# Patient Record
Sex: MALE | Race: WHITE
[De-identification: names, ages, dates, MRNs, and addresses within clinical notes are randomized per-mention and may not be internally consistent; named-entity substitution may affect disease eponyms.]

---

## 2019-04-20 ENCOUNTER — HOSPITAL ENCOUNTER (EMERGENCY)
Dept: HOSPITAL 95 - ER | Age: 34
Discharge: HOME | End: 2019-04-20
Payer: MEDICARE

## 2019-04-20 VITALS — WEIGHT: 295.99 LBS | HEIGHT: 64 IN | BODY MASS INDEX: 50.53 KG/M2

## 2019-04-20 DIAGNOSIS — J45.901: Primary | ICD-10-CM

## 2019-04-20 DIAGNOSIS — Z79.899: ICD-10-CM

## 2019-04-20 DIAGNOSIS — F32.9: ICD-10-CM

## 2019-04-20 LAB
ALBUMIN SERPL BCP-MCNC: 3.1 G/DL (ref 3.4–5)
ALBUMIN/GLOB SERPL: 0.6 {RATIO} (ref 0.8–1.8)
ALT SERPL W P-5'-P-CCNC: 15 U/L (ref 12–78)
ANION GAP SERPL CALCULATED.4IONS-SCNC: 8 MMOL/L (ref 6–16)
AST SERPL W P-5'-P-CCNC: 13 U/L (ref 12–37)
BASOPHILS # BLD AUTO: 0.07 K/MM3 (ref 0–0.23)
BASOPHILS NFR BLD AUTO: 1 % (ref 0–2)
BILIRUB SERPL-MCNC: 0.3 MG/DL (ref 0.1–1)
BUN SERPL-MCNC: 19 MG/DL (ref 8–24)
CALCIUM SERPL-MCNC: 9 MG/DL (ref 8.5–10.1)
CHLORIDE SERPL-SCNC: 103 MMOL/L (ref 98–108)
CO2 SERPL-SCNC: 27 MMOL/L (ref 21–32)
CREAT SERPL-MCNC: 1.01 MG/DL (ref 0.6–1.2)
DEPRECATED RDW RBC AUTO: 50.9 FL (ref 35.1–46.3)
EOSINOPHIL # BLD AUTO: 0.01 K/MM3 (ref 0–0.68)
EOSINOPHIL NFR BLD AUTO: 0 % (ref 0–6)
ERYTHROCYTE [DISTWIDTH] IN BLOOD BY AUTOMATED COUNT: 14.3 % (ref 11.7–14.2)
GLOBULIN SER CALC-MCNC: 5.1 G/DL (ref 2.2–4)
GLUCOSE SERPL-MCNC: 98 MG/DL (ref 70–99)
HCT VFR BLD AUTO: 37.6 % (ref 37–53)
HGB BLD-MCNC: 12.2 G/DL (ref 13.5–17.5)
IMM GRANULOCYTES # BLD AUTO: 0.05 K/MM3 (ref 0–0.1)
IMM GRANULOCYTES NFR BLD AUTO: 1 % (ref 0–1)
LYMPHOCYTES # BLD AUTO: 1.11 K/MM3 (ref 0.84–5.2)
LYMPHOCYTES NFR BLD AUTO: 12 % (ref 21–46)
MCHC RBC AUTO-ENTMCNC: 32.4 G/DL (ref 31.5–36.5)
MCV RBC AUTO: 98 FL (ref 80–100)
MONOCYTES # BLD AUTO: 0.65 K/MM3 (ref 0.16–1.47)
MONOCYTES NFR BLD AUTO: 7 % (ref 4–13)
NEUTROPHILS # BLD AUTO: 7.23 K/MM3 (ref 1.96–9.15)
NEUTROPHILS NFR BLD AUTO: 79 % (ref 41–73)
NRBC # BLD AUTO: 0 K/MM3 (ref 0–0.02)
NRBC BLD AUTO-RTO: 0 /100 WBC (ref 0–0.2)
PLATELET # BLD AUTO: 175 K/MM3 (ref 150–400)
POTASSIUM SERPL-SCNC: 3.8 MMOL/L (ref 3.5–5.5)
PROT SERPL-MCNC: 8.2 G/DL (ref 6.4–8.2)
SODIUM SERPL-SCNC: 138 MMOL/L (ref 136–145)

## 2021-09-04 NOTE — NUR
SHIFT SUMMARY/URINARY RETENTION
PT TRANSITIONED TO THE CPAP 70% FIO2. PT CURRENTLY ON HFT MODE AND TOLERATING
WELL AT 92%. PT UNABLE TO VOID THIS SHIFT. BLADDER SCAN VOLUME REVEALED MORE
THAN 1400. DR. CHRISTENSEN NOTIFIED AND ORDER FOR SORENSEN CATH OBTAINED. SORENSEN PLACED.
PT DID NOT TOLERATE WELL. UNCIRCUMSIZED AND URETHRA WAS DIFFICULT TO LOCATE.
DISCHARGE IN THE FORSKIN. SORESNEN NOW DRAINING WELL. CLAMPED AFTER 1300 CC
OUTPUT. MICONAZOLE ORDERED FOR YEAST IN FOLDS.

## 2021-09-04 NOTE — NUR
PTS MOTHER DAVIE UPDATED
 
PTS MOTHER UPDATED. DURING UPDATE SHE STATED THAT SHE DID NOT WANT HER SON
RECIEVING REMDESIVIR. PT EDUCATED THAT WE OBSEVERE KIDNEY FUNCTION PRIOR TO
STARTING THIS MEDICATION. 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
IVERMECTIN INSTEAD. DR. CHAVARRIA RELAYED THIS INFO AND GIVEN PTS MOTHERS NUMBER.

## 2021-09-04 NOTE — NUR
SHIFT SUMMARY - NO ACUTE CHANGES SINCE ADMIT LAST NOC AT 2400.  LR INFUSING
WITHOUT DIFFICULTY TO LEFT HAND IV SITE.  PT'S MOUTH/LIPS VERY DRY - ORAL CARE
PROVIDED, PT TOLERATED PO FLUIDS WITHOUT DIFFICULTY.  SATS WNL 15 L OXYGEN VIA
HF NC.  PT HAS DOWN SYNDROME.  PT SLOW TO RESPOND, BUT HAS BEEN ABLE TO MAKE
HIS NEEDS KNOWN WHILE IN THE ROOM.  CALL LIGHT WITHIN REACH.  BED IN LOW
POSITION.  FLUIDS AT BEDSIDE.  BED ALARM ON FOR PT SAFETY.

## 2021-09-04 NOTE — NUR
UPGRADE TO BIPAP.
PER CONVO WITH DR. CHAVARRIA THIS AM, PT NEEDS TO BE PLACED ON BIPAP TO HELP WITH
CO2 RETENTION. AILIN, RT NOTIFIED.

## 2021-09-04 NOTE — NUR
PT IS CURRENTLY SLEEPING, AWAKENS TO VERBAL COMMUNICATION BUT DRIFTS OFF BACK
TO SLEEP.  PT AWOKE STARTED, REORIENTED TO ROOM, CALL LIGHT.  BED ALARM ON.
URINAL AT BEDSIDE.

## 2021-09-05 NOTE — NUR
PT WAS ALERT AND ORIENTED AT START OF SHIFT. ON AIRVO AND SATS 90 AND ABOVE.
AT 0500 PT STARTED TO DESATURATE ON MONITOR. STAFF ENTERED ROOM WITH PULSE OX
AND MONITORED FROM INSIDE. PT CONTINUED TO DESATURATE
TO MID 60'S AND BECAME UNRESPONSIVE WHILE RT ON
THEIR WAY. ONCE ON BIPAP PT'S O2 STARTED TO INCREASE BACK TO HIS BASELINE. PT
THEN BECAME ALERT AND SCARED. PT STARTED TO SHAKE RAPIDLY WHILE TRYING TO PULL
HIS BIPAP OFF AND CLIMB OUT OF BED. PT STATES
"TAKE IT OFF PLEASE AND i WANT MY MOM." ORDER FOR 1 MG IV ATIVAN WAS ADMIN AND
PT WAS ABLE TO RELAX. CURRENTLY RESTING ON BIPAP WITH SATS 90 AND ABOVE. STAFF
WILL CONT TO MONITOR.

## 2021-09-05 NOTE — NUR
SHIFT SUMMARY
PT ON BIPAP MOST THE DAY. LETHARGIC THIS AM. ABG CHECKED TODAY. PT MOM
VISITED AND UPDATED ON PLAN OF CARE AT THIS TIME. PT TRANSITIONED TO HFT MODE
WITH NASAL CANULA CURRENTLY AND TOLERATING WELL. ONE TIME DOSE OF LASIX GIVEN
TO HELP WITH FLUID RETENTION. SORENSEN PATENT & DRAINING.

## 2021-09-06 NOTE — NUR
----- Message from Pratibha Diallo NP sent at 3/7/2019  3:45 PM CST -----  Nonspecific ST and/or T wave abnormalities. Pt advised to discuss with pcp. I will also will have MA fax EKG to pcp. QTc is 478 (normal is 480) she is already on celexa 20 mg daily and elavil 10 mg daily. I am hesitant to increase elavil to 25 mg given QTc is so close to abnormal and celexa can also prolong QTC (2%). Please advise,  Anu NP   SHIFT SUMMARY
PATIENT HAD NO ACUTE CHANGES OBSERVED. LETHARGIC T/O SHIFT. ON HFT MODE
STATING 86-92%. SORENSEN PATENT AND DRAINING. PIV REMAINS INTACT. IV ABXS
INFUSED. LR INFUSING AT 75mL/HR. DENIES PAIN AND N/V. BEDREST. NO ANXIETY
EVENTS OBSEVED. CBG 99. CALL LIGHT IN REACH. BED IN LOWEST POSITION AND ALARM
ACTIVATED. WCTM UNTIL DAY SHIFT NURSE ARRIVES.

## 2021-09-06 NOTE — NUR
PT AOX3 AND COOPERATIVE OF CARE. PT HAS BEEN DOING WELL TODAY AND WAS PUT ON
HIGH FLOW NASAL CANULA AND HAS BEEN DOING WELL. PT SEEMS TO BE IN GOOD
SPIRITS AND MOTHER IS AT BEDSIDE VISITING. PT IS ABLE TO USE CALL LIGHT AND
BED ALARM IS IN PLACE. PT CONTINUES TO HAVE A COUGH AND WILL DESAT WITH  A LOT
OF MOVEMENT, BUT RECOVERS. WILL CONTINUE TO MONITOR.

## 2021-09-07 NOTE — NUR
ADMIT: 09/04/21       DISCHARGE: TBD          DX: ACUTE RESP. FAILURE DUE TO
COVID-19
CC: NNEKA LUCIANO
LYNDSEY CALL: MOTHER DAVIE
RESIDENCE: LIVES WITH MOTHER - [address] [facility] [address]
CAREGIVER: MOTHER DAVIE [phone]
 
PRIOR TO ADMIT - DME: CPAP   CHRONIC CARE MANAGEMENT: NONE    HHC/HOSPICE:
NONE
 
UPDATE 09/07/21: PT. ADMITTED OVER THE WEEKEND. PER CHART REVIEW THIS AM, PT.
DID NOT HAVE ANY SIGNIFICANT CHANGES OVERNIGHT. NOTED TO CURRENTLY BY ON 15LPM
NC WITH SPO2 OF 91%. INCREASE FROM THE 10 LPM HAS BEEN ON. PATIENT'S MOTHER
HAS VISITED HIM TODAY. SHE HAS BEEN UPDATED REGARDING HIS CONDITION.
ANTICIPATED NEEDS AT TIME OF DISCHARGE TO INCLUDE: HOME O2 EVAL., HOME O2
POTENTIALLY (DEPENDENT UPON IMPROVEMENTS), XARELTO/ELQUIS SAMPLES, AND
HOSPITAL F/U WITH 5-7 DAYS POST DISCHARGE. PLAN TO CONTINUE TO FOLLOW PATIENTS
PROGRESS AND ASSIST WITH CARE COORDINATION AND DISCHARGE PLANNING AS NEEDED.

## 2021-09-07 NOTE — NUR
Pt did well throughout the night, pt on NC, tolerated well. Pt takes pills in
a/s, LR running at 75. Contiue POC

## 2021-09-07 NOTE — NUR
SHIFT SUMMARY
 
PATIENT ALERT AND ORIENTED X4. PATIENT PLEASANT AND COOPERATIVE WITH CARE.
PATIENT HAS BEEN TITRATED DOWN FROM 55L 85% TO 50L 67% ON AIRVO AND
MAINTAINING SATS AT 93-94 PERCENT ON LOWERED TITRATION. PATIENT STATES THAT HE
FEELS BETTER AND DOES NOT COMPLAIN OF PAIN, SHORTNESS OF BREATH OR ANY NEEDS
AT THIS TIME. PATIENTS DAUGHTER WAS UPDATED ON PATIENTS STATUS AND OXYGEN
NEEDS THIS AFTERNOON. PATIENT IS CONINENT AND USES URINAL. TELE SINUS RHYTHM.
WILL CONTINUE TO MONITOR UNTIL END OF SHIFT.

## 2021-09-07 NOTE — NUR
SHIFT SUMMARY
 
PATIENT IS ALERT AND ORIENTED 2-3X. PATIENT HAS GONE FROM 12L HIGH FLOW NASAL
CANNULA TO 15L DURING SHIFT TO MAINTAIN SATS IN THE LOW 90S. PATIENTS MOTHER
HAS BEEN VISITING DURING THE AFTERNOON HOURS AND SEEMS TO BRIGHTEN PATIENTS
MOOD. PATIENT DESATS WITH MOVEMENT AND EATING. BED IS IN LOCKED AND LOWEST
POSITION AND CALL LIGHT IS IN REACH. NO ADDITIONAL NEEDS AT THIS TIME. WILL
CONTINUE TO MONITOR UNTIL END OF SHIFT.

## 2021-09-08 NOTE — NUR
PT has downs syndrome & refusing care & IV restart. IV patent but positional.
Solumedrol given & saline flush.

## 2021-09-08 NOTE — NUR
SHIFT SUMMARY
 
PT HAS RESTED MOST OF THE NIGHT, AND PREFERS NOT TO BE DISTURBED BY STAFF. PT
ON 15L HIGH FLOW THROUGHOUT THE NIGHT AND MAINTAINS SATS IN THE LOW 90'S. PT
OCCASIONALLY DESATS IN THE 80'S BUT QUICKLY RECOVERS. PT RESPIRATIONS APPEAR
E/U AT REST AND PT DOES NOT APPEAR IN ANY DISTRESS. OCCASIONAL DRY COUGH. IV
ABX AND STEROIDS PER ORDERS. SORENSEN IN PLACE PATENT AND DRAINING. NO ACUTE
CHANGES OVERNIGHT. BED IN LOWEST POSITION, CALL LIGHT WITHIN REACH.

## 2021-09-08 NOTE — NUR
SHIFT SUMMARY
 
PATIENT HAS HAD NO ACUTE EVENTS DURING SHIFT. PATIENT IS ON 13 LITERS HIGH
FLOW NASAL CANNULA CURRENTLY AND SATTING IN THE LOW 90S. PATIENT HAS HAD NO
COMPLAINTS OF PAIN, NAUSEA, VOMITTING OR SHORTNESS OF BREATH. PATIENTS
MOM/CAREGIVER HAS VISITED MOST OF THE SHIFT AND HAS HELPED PATIENTS MOOD.
PATIENT HAS HAD INCREASED APPETITE TODAY AND AMBULATED TO CHAIR FOR MEALS AND
DOES NO DESAT WHILE EATING.

## 2021-09-09 NOTE — NUR
PATIENT IS ALERT AND ORIENTED AND COOPERATIVE WITH CARE. SORENSEN IS IN PLACE AND
DRAINING. ON 13L OXIMYZER. INCONTINENT OF STOOL. THE PATIENT'S MOM WAS AT THE
BEDSIDE THIS AFTERNOOON. HE HAS BEEN UP TO THE RECLINER SINCE LUNCH. WILL
FERMIN TO MONITOR

## 2021-09-09 NOTE — NUR
Update 09/09/21: Day 6 of hospitalization. Oxygen needs 13 LPM high flow NC.
SPO2 95%. Pt. desats with activity. Patient's mother Mary Anne has been able to
visit him. Nursing notes that pt. has declined care at times. Anticipated
needs at time of discharge to include: Home O2 eval, Home O2, hospital F/U
within 5-7 days. Patient's mother is his caregiver. Current plan for pt. to
return home with his mother Mary Anne at time of discharge.

## 2021-09-09 NOTE — NUR
36 YEAR OLD mALE WITH DOWNS SYNDROME & ASTHMA WITH COVID 19 CONTINUES ON IV
STEROIDS & BLOOD GLUCOSE IS ELEVATED ABOVE 200. HE HAS SORENSEN CATH PLACED FOR
ACUTE URINARY RETENTION & I DRAINED 2400 ML DILUTE YELLOW URINE. MEDS WHOLE IN
APPLESAUCE PT WAS REFUSING CARES & ASSESSMENT AT HS 7 REFUSED ATTENDS CHANGE
ALSO. ONCONT OF PASTY DARK BROWN BM. CONTINUES ON BIOXX &13 L OXIMIXER TO KEEP
SATS GREATER THAN 90% OCC DESATS TO 88%.

## 2021-09-10 NOTE — NUR
PATIENT IS ALERT AND ORIENTED AND COOPERATIVE WITH CARE. IMMODIUM ORDERED FOR
LOOSE STOOLS THIS AFTERNOON. ON 5L OXYMIZER. UP TO CHAIR FOR BREAKFAST AND
DINNER. SORENSEN IS IN PLACE AND DRAINING. THE PATIENT'S MOM WAS AT THE BEDSIDE
THIS AFTERNOON AND ASKED TO TALK TO DR. BENNETT. DR. BENNETT SAID SHE WOULD
CALL THE PATIENT'S MOM TOMORROW. NO NEW CONCERNS TODAY. WILL CONTINUE TO
MONITOR

## 2021-09-10 NOTE — NUR
36 year old Male with Downs syndrome & covid 19 weaned from 15 l high flow to
8 l high flow oxygen & tolerated well with sats 89 to 97% at rest. HE
continues with block cath for acute urinary retention & again has over 2000 ml
urine out in 12 hr shift. He is more pleasant but does says he wants to sleep.
Reportedly up in bedside chair on dayshift lunch thru dinner. Sips water only
my shift. Does not use call bell. Mother involved in care on day shift.

## 2021-09-11 NOTE — NUR
DISCHARGE NOTE: PT A/O X 3 ON DC. PT IV REMOVED. PT MOTHER PRESENT AND
INSTRUCTIONS GIVEN TO MOTHER. MOTHER VU. MEDICATIONS FAXED TO [facility]. PT
ESCORTED TO POV VIA [facility] AND CNA [facility]. PT HAD O2 DELIVERED AND WAS PLACED ON 2
LPM FOR TRANSPORT.

## 2021-09-11 NOTE — NUR
PT SORENSEN CATHETER REMOVED PER MD REQUEST. PT WAITING TO VOID TO BE DC'D FROM
HOSPITAL. XUAN NOTIFIED OF HOME O2 ORDER VIA FAX AND TC. SPOKE TO MEETA
AND AWAITING O2 TANK TO BE DELIVERED TO THE HOSPITAL AND CONCENTRATOR TO THE
HOME. PT HAS AMBULATED WITH ONE ASSIST AND GAIT BELT TO BATHROOM AND WAS
STEADY ON HIS FEET.

## 2021-09-11 NOTE — NUR
HOME O2 EVAL: PT SATS ON RA WAS 84% WHILE RESTING. PT O2 SATS ON 2LPM VIA HIGH
FLOW NC WAS 92-95%. PT TO GO HOME WITH O2.

## 2021-09-12 NOTE — NUR
RECIEVED A CALL FROM [facility] CAREGIVER UNABLE TO GET METHYLPREDNISOLONE FROM
PHARMACY "NON OF THEM HAVE THE 8 MG TABS IN STOCK" CALLED DR BENNETT AND NEW
ORDER RECIEVED FOR METHYLPREDNISOLONE 4MG TABS: TAKE 10 TABS FOR 3 DAYS,
THEN 8 TABS FOR 3 DAYS, THEN 6 TABS FOR 3 DAYS, THEN 4 TABS FOR 3 DAYS, THEN 2
TABS FOR 3 DAYS. DISP #90, NO REFILLS. CALLED RX TO [email or web address]
[place]. NOTIFIED [facility] CAREGIVER.

## 2022-01-17 NOTE — NUR
HARD TO FLUSH, APPEARS PULLED OUT, WILL RESTART NEW You can access the FollowMyHealth Patient Portal offered by [facility] by registering at the following website: [email or web address]. By joining [facility]’s FollowMyHealth portal, you will also be able to view your health information using other applications (apps) compatible with our system.

## 2023-04-20 NOTE — NUR
PT ADMITTED VIA GURNEY FROM ER.  PT TRANSFERRED TO MEDICAL FLOOR BED WITH 4
STAFF PRESENT.  PT ABLE TO FOLLOW INSTRUCTIONS WITH TURNING SIDE TO SIDE.  PT
IS ALERT TO PERSON, REORIENTED TO SURROUNDINGS, CALL LIGHT, DATE/TIME.  PT HAS
DOWN SYNDROME.  PT ABLE TO ANSWER YES/NO QUESTIONS, AND RESPONDS TO
OCCASIONAL QUESTION APPROPRIATELY.  PT PLACED ON 15L HIGH FLOW, WITH
HUMIDIFIED OXYGEN.  CALL LIGHT WITHIN REACH.  BED ALARM ON FOR PT SAFETY.
IV TO LEFT HAND FLUSHED WITHOUT COMPLICATIONS. Spoke to pt and appt rescheduled for 8/22 at 9:20am with  and jorge afterwards. [email or web address] thank you.

## 2025-06-16 NOTE — NUR
36 year old MAle with Covid 19 , asthma & Downs syndrome being weaned off
oxygen. Continues with wireless bioxx & is on 2 l high flow with sats 90 to
95%.IF PT desats he quickly recovers at rest. Bioxx showed 86% on 2 l so had
to resume 3 l this am when PT wide awake.Sat 93% on 3 l.  Mother cares for PT
assists with cares during day shift. Incont of bowels tonight, Mother says
chronic diarrhea on immodium to tx , rx given once with helpful effect. Physical Therapy: Daily Note   Patient: Zulma Moran (79 y.o. female)   Examination Date: 2025  Plan of Care/Certification Expiration Date: 25    No data recorded   :  1946 # of Visits since SOC:   2   MRN: [id number]  CSN: [id number] Start of Care Date:   2025   Insurance: Payor: HU[employer]NA MEDICARE / Plan: HU[employer]NA CHOICE-PPO MEDICARE / Product Type: *No Product type* /   Insurance ID: [id number] - (Medicare Managed) Secondary Insurance (if applicable):    Referring Physician: Sharonda Watts MD Marissa Stewart-Jaynes, MD   PCP: Sharonda Watts MD Visits to Date/Visits Approved:     No Show/Cancelled Appts:   /       Medical Diagnosis: Pain in left shoulder [M25.512]  Pain in right shoulder [M25.511]  Other chronic pain [G89.29]    No data recorded      SUBJECTIVE EXAMINATION   Pain Level: Pain Screening  Patient Currently in Pain: Yes  Pain Assessment: 0-10 (Did not quantify)  Pain Type: Chronic pain  Pain Location: Arm, Shoulder  Pain Orientation: Left  Pain Descriptors: Tightness    Patient Comments: Subjective: No new complaints since initial evaluation.  Continues to report pain in L biceps area.    OBJECTIVE EXAMINATION   Restrictions:  No data recorded No data recorded No data recorded      TREATMENT     Exercises:      Treatment Reasoning    Exercise 1: ++primarily left shoulder pain, some pain starting in right shoulder; start conservatively, gradually progress  Exercise 2: overhead pulleys--5 mins   -----finger walk left arm-- 5 reps (up to hole #10)  Exercise 3: left arm pendulum with 2# weight--15 reps clockwise,ccw  Exercise 4: bilateral forward bent rows,3- 4 pound weight 2/15 reps- not today  Exercise 5: supine Jackins' exercise on wedge, with 2 pound weight -progress to sitting as able--2/10- not today  Exercise 6: supine overhead cane raise--2 x 10  Exercise 7: simulated bench press with cane, add weight as tolerated--2/10 reps  Exercise 8: shoulder